# Patient Record
Sex: FEMALE | Race: WHITE | Employment: FULL TIME | URBAN - METROPOLITAN AREA
[De-identification: names, ages, dates, MRNs, and addresses within clinical notes are randomized per-mention and may not be internally consistent; named-entity substitution may affect disease eponyms.]

---

## 2017-08-07 ENCOUNTER — TELEPHONE (OUTPATIENT)
Dept: PEDIATRICS CLINIC | Facility: CLINIC | Age: 52
End: 2017-08-07

## 2017-08-07 NOTE — TELEPHONE ENCOUNTER
Pt calling to report that she would like to schedule an appt with [facility] for heavy vaginal bleeding. Pt stated that she has not had a period in 3 months and started bleeding heavily on Saturday.  Pt stated that the bleeding is bright red in color and she is ch

## 2017-08-21 ENCOUNTER — OFFICE VISIT (OUTPATIENT)
Dept: OBGYN CLINIC | Facility: CLINIC | Age: 52
End: 2017-08-21

## 2017-08-21 VITALS
SYSTOLIC BLOOD PRESSURE: 124 MMHG | HEART RATE: 71 BPM | BODY MASS INDEX: 25 KG/M2 | WEIGHT: 160 LBS | DIASTOLIC BLOOD PRESSURE: 78 MMHG

## 2017-08-21 DIAGNOSIS — N92.1 MENORRHAGIA WITH IRREGULAR CYCLE: Primary | ICD-10-CM

## 2017-08-21 PROCEDURE — 99203 OFFICE O/P NEW LOW 30 MIN: CPT | Performed by: OBSTETRICS & GYNECOLOGY

## 2017-08-21 NOTE — PROGRESS NOTES
Parker Houston    1965       Patient presents with:  Gyn Problem: IRREGULAR  HEAVY BLEEDING / Roddie Maico  pt c/o irreg heavy periods that have worsened over the past several months.   Pt menses are q 1-3 months and this last menses was h so heavy th [place]  9/13/11  Tricities: OTHER SURGICAL HISTORY      Comment: EGD (chest pain, dysphagia): antral erosive                gastritis (bx neg for H pylori); esophageal                biopsies negative     Pap Result Notes: PAP DONE ABOUT 2 YEARS AGO NORMAL

## 2017-11-10 PROCEDURE — 82607 VITAMIN B-12: CPT | Performed by: INTERNAL MEDICINE

## 2017-11-10 PROCEDURE — 82746 ASSAY OF FOLIC ACID SERUM: CPT | Performed by: INTERNAL MEDICINE

## 2025-01-10 ENCOUNTER — HOSPITAL ENCOUNTER (OUTPATIENT)
Dept: CT IMAGING | Age: 60
Discharge: HOME | End: 2025-01-10
Attending: FAMILY MEDICINE

## 2025-01-10 DIAGNOSIS — Z13.6 SCREENING FOR HEART DISEASE: ICD-10-CM

## 2025-01-10 NOTE — PROGRESS NOTES
Date of Service 1/10/2025    SHELTON PIMENTEL  Date of Birth 1/16/1965    Patient Age: 59 year old    PCP: Kim Esparza  [address] [facility] [address]  [facility] [id number]    Heart Scan Consult  Preliminary Heart Scan Score: 0    Previous Screening  Heart Scan Completed Previously: No        Peripheral Vascular Scan Completed Previously: No          Risk Factors  Personal Risk Factors  Non-alterable Risk Factors: Personal History;Age;Gender;Family History  Alterable Risk Factors: Abnormal Cholesterol        Blood Pressure  There were no vitals taken for this visit.  (Normal =< 120/80,  Elevated = 120-129/ >80,  High Stage1 130-139/80-89 , Stage2 >140/>90)    Lipid Profile  Cholesterol Modification (goal of therapy depends upon your risk):   Decrease LDL (Lousy/Bad) Ideal <100     (Today's FASTING Cholestech Values:  Total Cholesterol-290, HDL-76, LDL-204, Triglycerides-51, Glucose-89)    Cholesterol Goals  Value   Total  =< 200   HDL  = > 45 Men = > 55 Women   LDL   =< 100   Triglycerides  =< 150       Glucose and Hemoglobin A1C  No results found for: \"PGLU\", \"GLU\", \"A1C\"  (Normal Fasting Glucose < 100mg/dl )    Nurse Review  Risk factor information and results reviewed with Nurse: Yes    Recommended Follow Up:  Consult your physician regarding::   Final Heart Scan Report;  iscuss potential for Incidental Finding;  Discuss Potential for Score Variance      Recommendations for Change:  Nutrition Changes: Low Saturated Fat;Low Fat Dairy;Increase Fiber        Exercise: Enhance Current Program                   Repeat Heart Scan: 5 years if Calcium Score is 0.0              [place] Recommended Resources:  Recommended Resources: Upcoming Classes, Medical Services and Health Library [email or web address];    PV Screening  Recommended PV Screening: Carotids;Abdomen;Ankle-Brachial Index (ESAU)      Other Resources:: Educational handouts provided.      Glenn JOHNSON RN        Please Contact the Nurse Heart Line with any Questions  or Concerns [phone].

## 2025-01-28 ENCOUNTER — HOSPITAL ENCOUNTER (OUTPATIENT)
Dept: GENERAL RADIOLOGY | Facility: HOSPITAL | Age: 60
Discharge: HOME | End: 2025-01-28
Attending: PHYSICIAN ASSISTANT
Payer: COMMERCIAL

## 2025-01-28 ENCOUNTER — OFFICE VISIT (OUTPATIENT)
Age: 60
End: 2025-01-28
Payer: COMMERCIAL

## 2025-01-28 DIAGNOSIS — M54.50 LOW BACK PAIN, UNSPECIFIED BACK PAIN LATERALITY, UNSPECIFIED CHRONICITY, UNSPECIFIED WHETHER SCIATICA PRESENT: Primary | ICD-10-CM

## 2025-01-28 DIAGNOSIS — M47.816 LUMBAR SPONDYLOSIS: ICD-10-CM

## 2025-01-28 DIAGNOSIS — M48.062 SPINAL STENOSIS, LUMBAR REGION WITH NEUROGENIC CLAUDICATION: ICD-10-CM

## 2025-01-28 DIAGNOSIS — M53.3 SACROILIAC JOINT DYSFUNCTION OF LEFT SIDE: ICD-10-CM

## 2025-01-28 DIAGNOSIS — M54.50 LOW BACK PAIN, UNSPECIFIED BACK PAIN LATERALITY, UNSPECIFIED CHRONICITY, UNSPECIFIED WHETHER SCIATICA PRESENT: ICD-10-CM

## 2025-01-28 PROCEDURE — 99203 OFFICE O/P NEW LOW 30 MIN: CPT | Performed by: PHYSICIAN ASSISTANT

## 2025-01-28 PROCEDURE — 72110 X-RAY EXAM L-2 SPINE 4/>VWS: CPT | Performed by: PHYSICIAN ASSISTANT

## 2025-01-28 NOTE — PROGRESS NOTES
Patient: Saima Leon  Medical Record Number: [id number]  Site: [facility]  Referring Physician:  No ref. provider found  PCP: GERMAINE HINOJOSA        HISTORY OF CHIEF COMPLAINT:    Saima Leon is a 60 year old female, who complains of 1.5 years h/o left side radiating LBP.  Denies saddle anesthesia or incontinence.  Patient has low back pain that is more off to the left side and shoots sometimes into her left buttock.  She has no numbness and tingling.  No weakness of the legs.  She states she still able to walk about 2 to 3 miles a day.  She went to PT at [facility] and had minimal relief.  She worked with Dr. Park,  in chiropractics, without lasting relief.  She states that the pain comes and goes but has been much worse over the last couple of weeks.  She had an MRI at [facility] done but did not bring the images today.  She would like to go to [facility] in [place].    VAS Pain Score:  /10        The patient indicates she requires assistance with the following activities: Does not require assistance with any activities    Past Medical History:    BCC (basal cell carcinoma of skin)    C. difficile colitis    Presumptively treated    chronic sinusitis    Erosive gastritis EGD 9/11    Family history of melanoma    Genital HSV    Hypercholesteremia    Migraines    Nasal polyp    Nephrolithiasis    Reflux laryngitis    Tubular adenoma      Past Surgical History:   Procedure Laterality Date    Benign biopsy left  11/26/2014    Colonoscopy  11/2015    four small HP polyps, one small TA, rpt 2020    Colonoscopy,biopsy N/A 11/19/2015    Procedure: COLONOSCOPY, POSSIBLE BIOPSY, POSSIBLE POLYPECTOMY 06018;  Surgeon: Bharath Martin MD;  Location: [facility]    Colonoscopy,remv lesn,snare N/A 11/19/2015    Procedure: COLONOSCOPY, POSSIBLE BIOPSY, POSSIBLE POLYPECTOMY 12120;  Surgeon: hBarath Martin MD;  Location: [facility]    Other surgical history      sinus  surgery 91, 99, 01    Other surgical history  11    sx-LT [facility]--Dr. Ho    Other surgical history  11  Tricities    EGD (chest pain, dysphagia): antral erosive gastritis (bx neg for H pylori); esophageal biopsies negative      Family History   Problem Relation Age of Onset    Hypertension Father     Cancer Father         PROSTATE    Cancer Mother         MELANOMA    Diabetes Mother     Hypertension Mother     Diabetes Sister       Social History     Socioeconomic History    Marital status:    Tobacco Use    Smoking status: Former     Current packs/day: 0.00     Types: Cigarettes     Quit date: 1995     Years since quittin.0    Smokeless tobacco: Never    Tobacco comments:     smoked for 10 years   Substance and Sexual Activity    Alcohol use: Yes     Comment: 2/week    Drug use: No      Current Medications:  Current Outpatient Medications   Medication Sig Dispense Refill    Cholecalciferol (VITAMIN D) 1000 UNITS Oral Tab Take 1 capsule by mouth daily.      Omega-3 Fatty Acids (FISH OIL) 1000 MG Oral Cap 2 CAPSULES DAILY WITH A MEAL      Ibuprofen (IBU-200) 200 MG Oral Tab 1 TABLET EVERY 4 TO 6 HOURS AS NEEDED      RaNITidine HCl 150 MG Oral Cap Take 1 capsule (150 mg total) by mouth 2 (two) times daily. 60 capsule 12        Work History:  The patient currently works full time.        IMAGING STUDIES:  X-rays were reviewed with the patient today  X-rays  EXAM: X-RAY OF LUMBAR SPINE     CLINICAL INFORMATION: Back pain     FINDINGS:     AP, Lateral with flexion/extension views of lumbar spine completed.   5 lumbar vertebral bodies seen.   Mild Spondylosis is seen with disc space loss and sclerotic endplate changes and facet joint hypertrophy noted at L4-S1     No spondylolysis   No spondylolisthesis.  No fractures   No destructive lesions seen.        IMPRESSION:    Lumbar spondylosis as detailed above         Outside MRI report shows degeneration at L4-S1.  No significant stenosis.      PHYSICAL EXAMIMATION   PHYSICAL EXAMINATION: Saima Leon is a 60 year old   female who is observed sitting comfortably in the exam room alert and oriented times three. RESPIRATORY RATE: 18 She looks consistent her stated age.    There were no vitals taken for this visit.  The patient is well developed, well nourished, thin body habitus, well muscled.       Inspection: No acute distress.   Patient displays antalgic gait, and is able to normal heel walk, normal toe walk.     Coordination: Well coordinated, Fluid gait      ROM:  Forward Flexion  Pain     Extension  Pain free    Palpation:  See chart below:  Palpation of Lumbar Area Right   (POS or NEG) Left   (POS or NEG)   Lumbar paraspinals Neg Neg   SIJ Neg POS   PSIS Neg Neg   Trochanteric Bursa Neg Neg     Strength:      DTR's:     Left Right    Left Right    EHL 5/5 5/5  Patella  2+/4 2+/4    DF 5/5 5/5  Achilles  2+/4 2+/4    PF 5/5 5/5    Quad 5/5 5/5    IP 5/5 5/5    Sensation:  Sensory deficits noted on bilateral lower extremities to light touch: none    Tests:  Test Right   (POS or NEG) Left   (POS or NEG)   SLR Neg Neg   Clonus Neg Neg      Calves supple, NT   MUSCULOSKELETAL: Fluid, pain-free ROM to bilateral: ankles, knees  & hips                                                                                     MEDICAL DECISION MAKING:   Impression: Lumbar Spine:  Lumbar Spondylosis 721.3   Left Si joint pain  Plan: We discussed the diagnosis and treatment options today, including rationale for surgical vs non-surgical options.  The patient would like to start physical therapy at [facility] in [place].  She has failed to get relief with physical therapy at [facility].  She failed to get relief with chiropractic care at [facility].  She does not like the idea of taking any medications at this time for pain.  I do believe this is more left-sided SI joint pain and pain coming from her spondylosis.  She will bring her MRI at the next visit so I  can review it and not just go by the report.  I will see her back in a month and hopefully she is doing better.  If not we may discuss a diagnostic injection.  Restrictions and limitations were reviewed with the patient.  Concerns were addressed.  Questions were encouraged and answered.  Patient voiced understanding.  Images were reviewed and discussed with the patient.  They voiced understanding of what the images showed.        The patient indicates understanding of these issues and agrees to the plan.    Thank you very much.     Respectfully yours,    Michael Nguyen PA-C    This note was dictated using Dragon software. While it was briefly proofread prior to completion, some grammatical, spelling, and word choice errors due to dictation may still occur.

## 2025-02-14 ENCOUNTER — IMAGING SERVICES (OUTPATIENT)
Dept: OTHER | Age: 60
End: 2025-02-14

## 2025-02-24 RX ORDER — CHLORAL HYDRATE 500 MG
2 CAPSULE ORAL DAILY
COMMUNITY

## 2025-02-24 RX ORDER — PROGESTERONE 100 MG/1
100 CAPSULE ORAL DAILY
COMMUNITY

## 2025-02-24 RX ORDER — AMLODIPINE BESYLATE 5 MG/1
5 TABLET ORAL DAILY
COMMUNITY
Start: 2024-12-15

## 2025-02-24 RX ORDER — ACETAMINOPHEN 325 MG/1
650 TABLET ORAL EVERY 6 HOURS PRN
COMMUNITY

## 2025-02-24 RX ORDER — ESTRADIOL 0.03 MG/D
1 FILM, EXTENDED RELEASE TRANSDERMAL
COMMUNITY

## 2025-02-25 ENCOUNTER — ANESTHESIA (OUTPATIENT)
Dept: CARDIOLOGY | Age: 60
End: 2025-02-25

## 2025-02-25 ENCOUNTER — HOSPITAL ENCOUNTER (OUTPATIENT)
Dept: CARDIOLOGY | Age: 60
End: 2025-02-25
Attending: INTERNAL MEDICINE | Admitting: INTERNAL MEDICINE

## 2025-02-25 ENCOUNTER — HOSPITAL ENCOUNTER (OUTPATIENT)
Age: 60
Discharge: HOME | End: 2025-02-25
Attending: INTERNAL MEDICINE | Admitting: INTERNAL MEDICINE

## 2025-02-25 ENCOUNTER — ANESTHESIA EVENT (OUTPATIENT)
Dept: CARDIOLOGY | Age: 60
End: 2025-02-25

## 2025-02-25 VITALS
WEIGHT: 156.97 LBS | SYSTOLIC BLOOD PRESSURE: 119 MMHG | RESPIRATION RATE: 14 BRPM | HEART RATE: 67 BPM | TEMPERATURE: 98.4 F | DIASTOLIC BLOOD PRESSURE: 81 MMHG | BODY MASS INDEX: 23.79 KG/M2 | OXYGEN SATURATION: 95 % | HEIGHT: 68 IN

## 2025-02-25 DIAGNOSIS — I48.0 PAF (PAROXYSMAL ATRIAL FIBRILLATION)  (CMD): ICD-10-CM

## 2025-02-25 LAB
ACT BLD: 369 BASELINE/TARGET RANGES ARE SET BY CLINICIANS FOR EACH PATIENT/PROCEDURE
ACT BLD: 371 BASELINE/TARGET RANGES ARE SET BY CLINICIANS FOR EACH PATIENT/PROCEDURE
ATRIAL RATE (BPM): 62
P AXIS (DEGREES): 74
PR-INTERVAL (MSEC): 182
QRS-INTERVAL (MSEC): 84
QT-INTERVAL (MSEC): 428
QTC: 435
R AXIS (DEGREES): 37
REPORT TEXT: NORMAL
T AXIS (DEGREES): 66
VENTRICULAR RATE EKG/MIN (BPM): 62

## 2025-02-25 PROCEDURE — C1760 CLOSURE DEV, VASC: HCPCS | Performed by: INTERNAL MEDICINE

## 2025-02-25 PROCEDURE — 10004452 HB PACU ADDL 30 MINUTES: Performed by: INTERNAL MEDICINE

## 2025-02-25 PROCEDURE — 10006027 HB SUPPLY 278: Performed by: INTERNAL MEDICINE

## 2025-02-25 PROCEDURE — 93656 COMPRE EP EVAL ABLTJ ATR FIB: CPT

## 2025-02-25 PROCEDURE — 10002800 HB RX 250 W HCPCS: Performed by: ANESTHESIOLOGY

## 2025-02-25 PROCEDURE — C1730 CATH, EP, 19 OR FEW ELECT: HCPCS | Performed by: INTERNAL MEDICINE

## 2025-02-25 PROCEDURE — 10002807 HB RX 258: Performed by: NURSE ANESTHETIST, CERTIFIED REGISTERED

## 2025-02-25 PROCEDURE — C1894 INTRO/SHEATH, NON-LASER: HCPCS | Performed by: INTERNAL MEDICINE

## 2025-02-25 PROCEDURE — 85347 COAGULATION TIME ACTIVATED: CPT | Performed by: INTERNAL MEDICINE

## 2025-02-25 PROCEDURE — 13000004 HB  ANESTHESIA  GENERAL OUTSIDE OR: Performed by: INTERNAL MEDICINE

## 2025-02-25 PROCEDURE — 10002800 HB RX 250 W HCPCS

## 2025-02-25 PROCEDURE — C1759 CATH, INTRA ECHOCARDIOGRAPHY: HCPCS | Performed by: INTERNAL MEDICINE

## 2025-02-25 PROCEDURE — C1733 CATH, EP, OTHR THAN COOL-TIP: HCPCS | Performed by: INTERNAL MEDICINE

## 2025-02-25 PROCEDURE — 10002801 HB RX 250 W/O HCPCS: Performed by: INTERNAL MEDICINE

## 2025-02-25 PROCEDURE — 93005 ELECTROCARDIOGRAM TRACING: CPT | Performed by: INTERNAL MEDICINE

## 2025-02-25 PROCEDURE — 10006023 HB SUPPLY 272: Performed by: INTERNAL MEDICINE

## 2025-02-25 PROCEDURE — C1766 INTRO/SHEATH,STRBLE,NON-PEEL: HCPCS | Performed by: INTERNAL MEDICINE

## 2025-02-25 PROCEDURE — 13000001 HB PHASE II RECOVERY EA 30 MINUTES: Performed by: INTERNAL MEDICINE

## 2025-02-25 PROCEDURE — C1769 GUIDE WIRE: HCPCS | Performed by: INTERNAL MEDICINE

## 2025-02-25 PROCEDURE — 10002801 HB RX 250 W/O HCPCS: Performed by: NURSE ANESTHETIST, CERTIFIED REGISTERED

## 2025-02-25 PROCEDURE — 93656 COMPRE EP EVAL ABLTJ ATR FIB: CPT | Performed by: INTERNAL MEDICINE

## 2025-02-25 PROCEDURE — 10002803 HB RX 637: Performed by: INTERNAL MEDICINE

## 2025-02-25 PROCEDURE — 10002800 HB RX 250 W HCPCS: Performed by: NURSE ANESTHETIST, CERTIFIED REGISTERED

## 2025-02-25 PROCEDURE — 10004451 HB PACU RECOVERY 1ST 30 MINUTES: Performed by: INTERNAL MEDICINE

## 2025-02-25 DEVICE — THE VASCADE MVP XL VENOUS VASCULAR CLOSURE SYSTEM (VVCS) 10-12F IS INTENDED TO SEAL FEMORAL VEINS WITH SINGLE OR MULTIPLE ACCESS SITES IN ONE OR BOTH LIMBS AT THE COMPLETION OF CATHETERIZATION PROCEDURES. THE SYSTEM IS DESIGNED TO DELIVER A RESORBABLE COLLAGEN PATCH, EXTRA-VASCULARLY, AT VESSEL PUNCTURE SITES TO ACHIEVE HEMOSTASIS. FOR USE WITH 10FR TO 12FR (15F MAXIMUM OUTER DIAMETER) INTRODUCER SHEATHS; OVERALL LENGTH OF THE SHEATH (INCLUDING THE HUB) NEEDS TO BE LESS THAN 15CM.
Type: IMPLANTABLE DEVICE | Site: FEMORAL VEIN | Status: FUNCTIONAL
Brand: CARDIVA VASCADE MVP XL VVCS 10-12F

## 2025-02-25 DEVICE — THE VASCADE VCS IS INTENDED TO SEAL FEMORAL VESSEL PUNCTURE SITES AT THE COMPLETION OF CATHETER-BASED PROCEDURES.  THE SYSTEM IS DESIGNED TO DELIVER A RESORBABLE COLLAGEN PATCH, EXTRA-VASCULARLY, AT THE VESSEL PUNCTURE SITE TO ACHIEVE HEMOSTASIS.   FOR USE IN 6F & 7F INTRODUCER SHEATHS; OVERALL LENGTH OF THE SHEATH (INCLUDING THE HUB) NEEDS TO BE LESS THAN 15CM.
Type: IMPLANTABLE DEVICE | Site: FEMORAL VEIN | Status: FUNCTIONAL
Brand: CARDIVA VASCADE 6/7F VCS

## 2025-02-25 RX ORDER — 0.9 % SODIUM CHLORIDE 0.9 %
2 VIAL (ML) INJECTION EVERY 12 HOURS SCHEDULED
Status: DISCONTINUED | OUTPATIENT
Start: 2025-02-25 | End: 2025-02-25 | Stop reason: HOSPADM

## 2025-02-25 RX ORDER — SODIUM CHLORIDE 9 MG/ML
INJECTION, SOLUTION INTRAVENOUS CONTINUOUS PRN
Status: DISCONTINUED | OUTPATIENT
Start: 2025-02-25 | End: 2025-02-25

## 2025-02-25 RX ORDER — LIDOCAINE HYDROCHLORIDE AND EPINEPHRINE 10; 10 MG/ML; UG/ML
INJECTION, SOLUTION INFILTRATION; PERINEURAL PRN
Status: DISCONTINUED | OUTPATIENT
Start: 2025-02-25 | End: 2025-02-25 | Stop reason: HOSPADM

## 2025-02-25 RX ORDER — PROTAMINE SULFATE 10 MG/ML
INJECTION, SOLUTION INTRAVENOUS PRN
Status: DISCONTINUED | OUTPATIENT
Start: 2025-02-25 | End: 2025-02-25

## 2025-02-25 RX ORDER — MIDAZOLAM HYDROCHLORIDE 1 MG/ML
INJECTION, SOLUTION INTRAMUSCULAR; INTRAVENOUS PRN
Status: DISCONTINUED | OUTPATIENT
Start: 2025-02-25 | End: 2025-02-25

## 2025-02-25 RX ORDER — ALBUTEROL SULFATE 0.83 MG/ML
2.5 SOLUTION RESPIRATORY (INHALATION)
Status: DISCONTINUED | OUTPATIENT
Start: 2025-02-25 | End: 2025-02-25 | Stop reason: HOSPADM

## 2025-02-25 RX ORDER — PROCHLORPERAZINE EDISYLATE 5 MG/ML
5 INJECTION INTRAMUSCULAR; INTRAVENOUS EVERY 4 HOURS PRN
Status: DISCONTINUED | OUTPATIENT
Start: 2025-02-25 | End: 2025-02-25 | Stop reason: HOSPADM

## 2025-02-25 RX ORDER — NICOTINE POLACRILEX 4 MG
30 LOZENGE BUCCAL
Status: DISCONTINUED | OUTPATIENT
Start: 2025-02-25 | End: 2025-02-25 | Stop reason: HOSPADM

## 2025-02-25 RX ORDER — LIDOCAINE HYDROCHLORIDE 20 MG/ML
INJECTION, SOLUTION INFILTRATION; PERINEURAL PRN
Status: DISCONTINUED | OUTPATIENT
Start: 2025-02-25 | End: 2025-02-25

## 2025-02-25 RX ORDER — 0.9 % SODIUM CHLORIDE 0.9 %
10 VIAL (ML) INJECTION PRN
Status: DISCONTINUED | OUTPATIENT
Start: 2025-02-25 | End: 2025-02-25 | Stop reason: HOSPADM

## 2025-02-25 RX ORDER — DEXTROSE MONOHYDRATE 25 G/50ML
25 INJECTION, SOLUTION INTRAVENOUS PRN
Status: DISCONTINUED | OUTPATIENT
Start: 2025-02-25 | End: 2025-02-25 | Stop reason: HOSPADM

## 2025-02-25 RX ORDER — PROPOFOL 10 MG/ML
INJECTION, EMULSION INTRAVENOUS PRN
Status: DISCONTINUED | OUTPATIENT
Start: 2025-02-25 | End: 2025-02-25

## 2025-02-25 RX ORDER — HYDROCODONE BITARTRATE AND ACETAMINOPHEN 5; 325 MG/1; MG/1
1 TABLET ORAL EVERY 4 HOURS PRN
Status: DISCONTINUED | OUTPATIENT
Start: 2025-02-25 | End: 2025-02-25 | Stop reason: HOSPADM

## 2025-02-25 RX ORDER — DROPERIDOL 2.5 MG/ML
0.62 INJECTION, SOLUTION INTRAMUSCULAR; INTRAVENOUS
Status: DISCONTINUED | OUTPATIENT
Start: 2025-02-25 | End: 2025-02-25 | Stop reason: RX

## 2025-02-25 RX ORDER — ONDANSETRON 2 MG/ML
4 INJECTION INTRAMUSCULAR; INTRAVENOUS EVERY 12 HOURS PRN
Status: DISCONTINUED | OUTPATIENT
Start: 2025-02-25 | End: 2025-02-25 | Stop reason: HOSPADM

## 2025-02-25 RX ORDER — IBUPROFEN 200 MG
400 TABLET ORAL
Status: DISCONTINUED | OUTPATIENT
Start: 2025-02-25 | End: 2025-02-25 | Stop reason: HOSPADM

## 2025-02-25 RX ORDER — ONDANSETRON 4 MG/1
4 TABLET, ORALLY DISINTEGRATING ORAL EVERY 12 HOURS PRN
Status: DISCONTINUED | OUTPATIENT
Start: 2025-02-25 | End: 2025-02-25 | Stop reason: HOSPADM

## 2025-02-25 RX ORDER — ONDANSETRON 2 MG/ML
4 INJECTION INTRAMUSCULAR; INTRAVENOUS 2 TIMES DAILY PRN
Status: DISCONTINUED | OUTPATIENT
Start: 2025-02-25 | End: 2025-02-25 | Stop reason: HOSPADM

## 2025-02-25 RX ORDER — HYDRALAZINE HYDROCHLORIDE 20 MG/ML
5 INJECTION INTRAMUSCULAR; INTRAVENOUS EVERY 10 MIN PRN
Status: DISCONTINUED | OUTPATIENT
Start: 2025-02-25 | End: 2025-02-25 | Stop reason: HOSPADM

## 2025-02-25 RX ORDER — PHENYLEPHRINE HYDROCHLORIDE 10 MG/ML
INJECTION, SOLUTION INTRAMUSCULAR; INTRAVENOUS; SUBCUTANEOUS PRN
Status: DISCONTINUED | OUTPATIENT
Start: 2025-02-25 | End: 2025-02-25

## 2025-02-25 RX ORDER — ACETAMINOPHEN 325 MG/1
650 TABLET ORAL
Status: DISCONTINUED | OUTPATIENT
Start: 2025-02-25 | End: 2025-02-25 | Stop reason: HOSPADM

## 2025-02-25 RX ADMIN — SODIUM CHLORIDE: 9 INJECTION, SOLUTION INTRAVENOUS at 10:36

## 2025-02-25 RX ADMIN — PHENYLEPHRINE HYDROCHLORIDE 40 MCG: 10 INJECTION INTRAVENOUS at 11:34

## 2025-02-25 RX ADMIN — PROPOFOL 200 MG: 10 INJECTION, EMULSION INTRAVENOUS at 10:40

## 2025-02-25 RX ADMIN — HYDROMORPHONE HYDROCHLORIDE 0.2 MG: 1 INJECTION, SOLUTION INTRAMUSCULAR; INTRAVENOUS; SUBCUTANEOUS at 12:33

## 2025-02-25 RX ADMIN — PHENYLEPHRINE HYDROCHLORIDE 40 MCG: 10 INJECTION INTRAVENOUS at 11:27

## 2025-02-25 RX ADMIN — MIDAZOLAM HYDROCHLORIDE 2 MG: 1 INJECTION, SOLUTION INTRAMUSCULAR; INTRAVENOUS at 10:36

## 2025-02-25 RX ADMIN — FENTANYL CITRATE 25 MCG: 50 INJECTION INTRAMUSCULAR; INTRAVENOUS at 10:50

## 2025-02-25 RX ADMIN — FENTANYL CITRATE 25 MCG: 50 INJECTION INTRAMUSCULAR; INTRAVENOUS at 11:19

## 2025-02-25 RX ADMIN — HYDROMORPHONE HYDROCHLORIDE 0.2 MG: 1 INJECTION, SOLUTION INTRAMUSCULAR; INTRAVENOUS; SUBCUTANEOUS at 12:47

## 2025-02-25 RX ADMIN — PHENYLEPHRINE HYDROCHLORIDE 80 MCG: 10 INJECTION INTRAVENOUS at 11:29

## 2025-02-25 RX ADMIN — HYDROCODONE BITARTRATE AND ACETAMINOPHEN 1 TABLET: 5; 325 TABLET ORAL at 14:09

## 2025-02-25 RX ADMIN — HEPARIN SODIUM 14000 UNITS: 1000 INJECTION INTRAVENOUS; SUBCUTANEOUS at 11:01

## 2025-02-25 RX ADMIN — LIDOCAINE HYDROCHLORIDE 2 ML: 20 INJECTION, SOLUTION INFILTRATION; PERINEURAL at 10:40

## 2025-02-25 RX ADMIN — FENTANYL CITRATE 25 MCG: 50 INJECTION INTRAMUSCULAR; INTRAVENOUS at 11:12

## 2025-02-25 RX ADMIN — FENTANYL CITRATE 25 MCG: 50 INJECTION INTRAMUSCULAR; INTRAVENOUS at 11:17

## 2025-02-25 RX ADMIN — PROTAMINE SULFATE 50 MG: 10 INJECTION, SOLUTION INTRAVENOUS at 11:41

## 2025-02-25 ASSESSMENT — PAIN SCALES - GENERAL
PAINLEVEL_OUTOF10: 0
PAINLEVEL_OUTOF10: 1
PAINLEVEL_OUTOF10: 3
PAINLEVEL_OUTOF10: 6
PAINLEVEL_OUTOF10: 4
PAINLEVEL_OUTOF10: 6
PAINLEVEL_OUTOF10: 1
PAINLEVEL_OUTOF10: 0
PAINLEVEL_OUTOF10: 6
PAINLEVEL_OUTOF10: 3
PAINLEVEL_OUTOF10: 4
PAINLEVEL_OUTOF10: 6
PAINLEVEL_OUTOF10: 3
PAINLEVEL_OUTOF10: 4
PAINLEVEL_OUTOF10: 3

## 2025-04-07 ENCOUNTER — PATIENT MESSAGE (OUTPATIENT)
Age: 60
End: 2025-04-07

## 2025-04-07 NOTE — TELEPHONE ENCOUNTER
Called and spoke w/ patient.   Patient states she has not been able to complete as many PT visits due to other medical procedures.   R/s to 5/5/25 w/ PA-C to allow more time for PT. Appt date/time/location provided to patient.

## 2025-05-05 ENCOUNTER — OFFICE VISIT (OUTPATIENT)
Age: 60
End: 2025-05-05
Payer: COMMERCIAL

## 2025-05-05 DIAGNOSIS — M54.50 BILATERAL LOW BACK PAIN, UNSPECIFIED CHRONICITY, UNSPECIFIED WHETHER SCIATICA PRESENT: Primary | ICD-10-CM

## 2025-05-05 DIAGNOSIS — M47.816 LUMBAR SPONDYLOSIS: ICD-10-CM

## 2025-05-05 PROCEDURE — 99213 OFFICE O/P EST LOW 20 MIN: CPT | Performed by: PHYSICIAN ASSISTANT

## 2025-05-05 NOTE — PROGRESS NOTES
Patient: Saima Leon  Medical Record Number: [id number]  Site: [facility]  Referring Physician:  No ref. provider found  PCP: GERMAINE HINOJOSA        HISTORY OF CHIEF COMPLAINT:    Saima Leon is a 60 year old female, who complains of 1.5 years h/o left side radiating LBP.  Denies saddle anesthesia or incontinence.  Patient states the back pain she saw me for at the last visit in January has improved.  She has been working with [employer] in [place] and states they really did help her.  She was delayed in coming in due to needing an ablation for atrial fibrillation.  Since the ablation she has come off her anticoagulant.  She was also started on HRT at the same time.  Since starting the HRT she has significant muscle spasms along her paraspinals and lumbar spine.  Her pain is made worse with extension.  This pain is different than the pain she came and saw me for in January.  She states her muscles in her low back become very tense.  Physical therapy, and massage therapy, do give some relief of the symptoms.  Heat also helps.  She is unable to get long-lasting relief.    VAS Pain Score:  /10        The patient indicates she requires assistance with the following activities: Does not require assistance with any activities    Past Medical History:    BCC (basal cell carcinoma of skin)    C. difficile colitis    Presumptively treated    chronic sinusitis    Erosive gastritis EGD 9/11    Family history of melanoma    Genital HSV    Hypercholesteremia    Migraines    Nasal polyp    Nephrolithiasis    Reflux laryngitis    Tubular adenoma      Past Surgical History:   Procedure Laterality Date    Benign biopsy left  11/26/2014    Colonoscopy  11/2015    four small HP polyps, one small TA, rpt 2020    Colonoscopy,biopsy N/A 11/19/2015    Procedure: COLONOSCOPY, POSSIBLE BIOPSY, POSSIBLE POLYPECTOMY 49209;  Surgeon: Bharath Martin MD;  [facility]    Colonoscopy,remv  adelita alvarez N/A 2015    Procedure: COLONOSCOPY, POSSIBLE BIOPSY, POSSIBLE POLYPECTOMY 33661;  Surgeon: Bharath Martin MD;  Location: [facility]    Other surgical history      sinus surgery 91, 99, 01    Other surgical history  11    sx-LT [facility]--Dr. Ho    Other surgical history  11  Tricities    EGD (chest pain, dysphagia): antral erosive gastritis (bx neg for H pylori); esophageal biopsies negative      Family History   Problem Relation Age of Onset    Hypertension Father     Cancer Father         PROSTATE    Cancer Mother         MELANOMA    Diabetes Mother     Hypertension Mother     Diabetes Sister       Social History     Socioeconomic History    Marital status:    Tobacco Use    Smoking status: Former     Current packs/day: 0.00     Types: Cigarettes     Quit date: 1995     Years since quittin.3    Smokeless tobacco: Never    Tobacco comments:     smoked for 10 years   Substance and Sexual Activity    Alcohol use: Yes     Comment: 2/week    Drug use: No      Current Medications:  Current Outpatient Medications   Medication Sig Dispense Refill    RaNITidine HCl 150 MG Oral Cap Take 1 capsule (150 mg total) by mouth 2 (two) times daily. 60 capsule 12    Cholecalciferol (VITAMIN D) 1000 UNITS Oral Tab Take 1 capsule by mouth daily.      Omega-3 Fatty Acids (FISH OIL) 1000 MG Oral Cap 2 CAPSULES DAILY WITH A MEAL      Ibuprofen (IBU-200) 200 MG Oral Tab 1 TABLET EVERY 4 TO 6 HOURS AS NEEDED          Work History:  The patient currently works full time.        IMAGING STUDIES:  X-rays were reviewed with the patient today  X-rays  EXAM: X-RAY OF LUMBAR SPINE     CLINICAL INFORMATION: Back pain     FINDINGS:     AP, Lateral with flexion/extension views of lumbar spine completed.   5 lumbar vertebral bodies seen.   Mild Spondylosis is seen with disc space loss and sclerotic endplate changes and facet joint hypertrophy noted at L4-S1     No spondylolysis   No  spondylolisthesis.  No fractures   No destructive lesions seen.        IMPRESSION:    Lumbar spondylosis as detailed above         Outside MRI report shows degeneration at L4-S1.  No significant stenosis.     PHYSICAL EXAMIMATION   PHYSICAL EXAMINATION: Saima Leon is a 60 year old   female who is observed sitting comfortably in the exam room alert and oriented times three. RESPIRATORY RATE: 18 She looks consistent her stated age.    There were no vitals taken for this visit.  The patient is well developed, well nourished, thin body habitus, well muscled.       Inspection: No acute distress.   Patient displays antalgic gait, and is able to normal heel walk, normal toe walk.     Coordination: Well coordinated, Fluid gait      ROM:  Forward Flexion  Pain free    Extension  Pain     Palpation:  See chart below:  Palpation of Lumbar Area Right   (POS or NEG) Left   (POS or NEG)   Lumbar paraspinals Neg Neg   SIJ Neg POS   PSIS Neg Neg   Trochanteric Bursa Neg Neg     Strength:      DTR's:     Left Right    Left Right    EHL 5/5 5/5  Patella  2+/4 2+/4    DF 5/5 5/5  Achilles  2+/4 2+/4    PF 5/5 5/5    Quad 5/5 5/5    IP 5/5 5/5    Sensation:  Sensory deficits noted on bilateral lower extremities to light touch: none    Tests:  Test Right   (POS or NEG) Left   (POS or NEG)   SLR Neg Neg   Clonus Neg Neg      Calves supple, NT   MUSCULOSKELETAL: Fluid, pain-free ROM to bilateral: ankles, knees  & hips                                                                                     MEDICAL DECISION MAKING:   Impression: Lumbar Spine:  Lumbar Spondylosis 721.3   Left Si joint pain  Plan: We discussed the diagnosis and treatment options today, including rationale for surgical vs non-surgical options.  The patient is doing better than last visit.  She now has new symptoms.  She is getting muscle spasms that are not relieved with physical therapy.  She does get some relief with heat.  I do think this is  muscular pain.  We discussed the possibility of it being facet mediated.  She did not bring her MRI in today.  She is going to see physiatry at this time.  I asked her to bring her MRI for that appointment.  She also will let me know if get scanned in the system.  We discussed trigger point injections versus facet injections.  Her concerns were addressed regarding this.  She will follow-up with physiatry at this time.  Restrictions and limitations were reviewed with the patient.  Concerns were addressed.  Questions were encouraged and answered.  Patient voiced understanding.  Images were reviewed and discussed with the patient.  They voiced understanding of what the images showed.        The patient indicates understanding of these issues and agrees to the plan.    Thank you very much.     Respectfully yours,    Michael Nguyen PA-C    This note was dictated using Dragon software. While it was briefly proofread prior to completion, some grammatical, spelling, and word choice errors due to dictation may still occur.

## 2025-05-28 ENCOUNTER — OFFICE VISIT (OUTPATIENT)
Dept: PHYSICAL MEDICINE AND REHAB | Facility: CLINIC | Age: 60
End: 2025-05-28
Payer: COMMERCIAL

## 2025-05-28 VITALS — BODY MASS INDEX: 23.95 KG/M2 | WEIGHT: 158 LBS | HEIGHT: 68 IN

## 2025-05-28 DIAGNOSIS — M47.816 LUMBAR SPONDYLOSIS: Primary | ICD-10-CM

## 2025-05-28 PROCEDURE — 99204 OFFICE O/P NEW MOD 45 MIN: CPT | Performed by: PHYSICAL MEDICINE & REHABILITATION

## 2025-05-28 NOTE — PROGRESS NOTES
[facility]  Progress Note    CHIEF COMPLAINT:    Chief Complaint   Patient presents with    Back Pain     New patient c/o back pain that usually is in the middle of her back and goes between there and her ribcage. Started a couple months ago without injury. Did PT for her low back which helped somewhat. Denies N/T. Had lumbar MRI 24, lumbar xray 25. Ibuprofen PRN with some relief. LOP 1/10       History of Present Illness:  Saima Leon is a 60 year old female who is being seen in consultation at the request of RUTH Riley.  She has a chief complaint of axial back pain.  She has been worked up with a plain film x-ray this January and a lumbar MRI in May.  Thus far physical therapy has been helpful.  She took some time off to have an ablation for atrial fibrillation which is also been successful.  She has little pain today.  There may be a suspicion of sacroiliac pain.      PAST MEDICAL HISTORY:  Past Medical History[1]    SURGICAL HISTORY:  Past Surgical History[2]    SOCIAL HISTORY:   Social History     Occupational History    Not on file   Tobacco Use    Smoking status: Former     Current packs/day: 0.00     Types: Cigarettes     Quit date: 1995     Years since quittin.4    Smokeless tobacco: Never    Tobacco comments:     smoked for 10 years   Substance and Sexual Activity    Alcohol use: Yes     Comment: 2/week    Drug use: No    Sexual activity: Not on file       CURRENT MEDICATIONS:   Current Medications[3]    ALLERGIES:   Allergies[4]    REVIEW OF SYSTEMS:   No patient-reported data collected this visit.            PHYSICAL EXAM:   Ht 68\"   Wt 158 lb (71.7 kg)   BMI 24.02 kg/m²     Body mass index is 24.02 kg/m².      General: No immediate distress  Head: Normocephalic/ Atraumatic  Extremities: No lower extremity edema bilaterally. Peripheral pulses intact.   Spine:  full and painfree lumbar ROM in all directions, no pain with  compression of SI joints  Hips: full and painfree ROM, negative CARLYN, negative FADIR  Neuro:   Cognition: alert & oriented x 3, attentive, able to follow 2 step commands, comprehention intact, spontaneous speech intact  Strength: Lower extremities have 5/5 strength  Sensation: Normal lower extremities  Reflexes: Normal lower extremities  SLR: neg    Data    Radiology Imaging:  I personally reviewed a plain film x-ray of the lumbar spine With flexion-extension views showing lower lumbar facet arthropathy and tiny disc endplate osteophytes.  I personally reviewed a lumbar MRI showing disc degeneration, no stenosis, no nerve root compression.  There are large benign hemangiomas within the bodies of T12 and L3        ASSESSMENT AND PLAN:  1. Lumbar spondylosis  Right now the patient is asymptomatic.  No evidence of a symptomatic SI joint today.  I recommend she continue with regular home exercises.  I recommend walking 30 minutes/day as a preventative measure also consideration of Pilates long-term.  If symptoms worsen she is welcome to return.  No intervention necessary.          The patient was in agreement with the assessment and plan.  All questions were answered.        Derrell Alberto MD  Physical Medicine and Rehabilitation/Sports Medicine  [facility]         [1]   Past Medical History:   Anxiety    BCC (basal cell carcinoma of skin)    C. difficile colitis    Presumptively treated    chronic sinusitis    Erosive gastritis EGD 9/11    Esophageal reflux    Essential hypertension    Family history of melanoma    Genital HSV    Hypercholesteremia    Hyperlipidemia    Migraines    Nasal polyp    Nephrolithiasis    Reflux laryngitis    Tubular adenoma   [2]   Past Surgical History:  Procedure Laterality Date    Benign biopsy left  11/26/2014    Colonoscopy  11/01/2015    four small HP polyps, one small TA, rpt 2020    Colonoscopy      Colonoscopy,biopsy N/A 11/19/2015    Procedure: COLONOSCOPY,  POSSIBLE BIOPSY, POSSIBLE POLYPECTOMY 90945;  Surgeon: Bharath Martin MD;  [place] [facility]    Colonoscopy,remv lesn,snare N/A 2015    Procedure: COLONOSCOPY, POSSIBLE BIOPSY, POSSIBLE POLYPECTOMY 97542;  Surgeon: Bharath Martin MD;  [facility]: [facility]          Other surgical history      sinus surgery 91, 99, 01    Other surgical history  2011    sx-LT [facility]--Dr. Ho    Other surgical history  11  Tricities    EGD (chest pain, dysphagia): antral erosive gastritis (bx neg for H pylori); esophageal biopsies negative   [3]   Current Outpatient Medications   Medication Sig Dispense Refill    Cholecalciferol (VITAMIN D) 1000 UNITS Oral Tab Take 1 capsule by mouth daily.      Omega-3 Fatty Acids (FISH OIL) 1000 MG Oral Cap 2 CAPSULES DAILY WITH A MEAL      Ibuprofen (IBU-200) 200 MG Oral Tab 1 TABLET EVERY 4 TO 6 HOURS AS NEEDED     [4]   Allergies  Allergen Reactions    Macrobid [Nitrofurantoin] HIVES, RASH and ITCHING

## (undated) DEVICE — CATHETER US VIEWFLEX X ICE

## (undated) DEVICE — GUIDEWIRE VASC OD.035 IN L210 CM INQWIRE 1.5 MM J CRV FX

## (undated) DEVICE — ELECTRODE PT RTN L9 FT VALLEYLAB REM POLYHESIVE ACRYLIC FOAM

## (undated) DEVICE — Device

## (undated) DEVICE — INTRODUCER SHTH OD12 FR L12 CM HEMOSTASIS VLV SDPRT GW J TIP

## (undated) DEVICE — SYRINGE 20 ML GRAD LOK MED

## (undated) DEVICE — INTRODUCER SHTH OD7 FR L12 CM HEMOSTASIS VLV SDPRT DIL

## (undated) DEVICE — KIT ELECTRODE SRFC ENSITE

## (undated) DEVICE — CATHETER 10 ELECTRODE 6FR 2-5-2MM SPACE LG 4 CRV 110CM DYN

## (undated) DEVICE — ELECTRODE DFBR ADULT L4.5 IN X W6.25 IN PREBENT MEDI-TRACE

## (undated) DEVICE — CATHETER ABLT OTW 20 ELECTRODE FARAWAVE L115 CM L180 CM OD31

## (undated) DEVICE — BLANKET WARMING L221 IN X W91 IN BAIR HGGR PLMR ADULT L24 IN

## (undated) DEVICE — CABLE CATH CNCT FARASTAR

## (undated) DEVICE — DRESSING TRANS 4.75X4IN ADH HPOAL WTPRF TEGADERM PU STD STRL

## (undated) DEVICE — SET ANGIO 72IN IV TUBE MIC DRIP CHMBR MH FLTR MALE LL STRL

## (undated) DEVICE — HOLDER LIMB THK.25IN 13X3IN 31IN 2 PC 1 STRAP QRLSE BCKL

## (undated) DEVICE — GLOVE SURG 7.5 PROTEXIS LF CRM PF SMTH BEAD CUFF STRL

## (undated) DEVICE — GUIDEWIRE VERSACROSS 180CM 93CM 1 PGTL CRV 9MM VASC TRNSPT

## (undated) DEVICE — SHEATH INTRO L74 CM STRBL ID13 FR FARADRIVE

## (undated) NOTE — LETTER
WHERE IS YOUR PAIN NOW?  Amaury the areas on your body where you feel the described sensations.  Use the appropriate symbol.  Amaury the areas of radiation.  Include all affected areas.  Just to complete the picture, please draw in the face.     ACHE:  ^ ^ ^   NUMBNESS:  0000   PINS & NEEDLES:  = = = =                              ^ ^ ^                       0000              = = = =                                    ^ ^ ^                       0000            = = = =      BURNING:  XXXX   STABBING: ////                  XXXX                ////                         XXXX          ////     Please amaury the line below indicating your degree of pain right now  with 0 being no pain 10 being the worst pain possible.                                         0             1             2              3             4              5              6              7             8             9             10         Patient Signature:

## (undated) NOTE — Clinical Note
Dear Michael,  I had the opportunity to see your patient Saima Leon recently. I appreciate your confidence in me to care for your patients. Please feel free call me with any questions at [phone] or contact me through Epic.  Sincerely, Pablo Alberto MD Board Certified, Physical Medicine and Rehabilitation Specializing in Sports Medicine, Spine Medicine and Electrodiagnostic Medicine [facility]